# Patient Record
Sex: MALE | Race: WHITE
[De-identification: names, ages, dates, MRNs, and addresses within clinical notes are randomized per-mention and may not be internally consistent; named-entity substitution may affect disease eponyms.]

---

## 2020-11-04 ENCOUNTER — HOSPITAL ENCOUNTER (EMERGENCY)
Dept: HOSPITAL 43 - DL.ED | Age: 60
Discharge: HOME | End: 2020-11-04
Payer: COMMERCIAL

## 2020-11-04 DIAGNOSIS — Z79.01: ICD-10-CM

## 2020-11-04 DIAGNOSIS — Z79.82: ICD-10-CM

## 2020-11-04 DIAGNOSIS — Z79.899: ICD-10-CM

## 2020-11-04 DIAGNOSIS — Z20.828: ICD-10-CM

## 2020-11-04 DIAGNOSIS — J44.1: Primary | ICD-10-CM

## 2020-11-04 LAB
ANION GAP SERPL CALC-SCNC: 10 MEQ/L (ref 7–13)
CHLORIDE SERPL-SCNC: 101 MMOL/L (ref 98–107)
SODIUM SERPL-SCNC: 139 MMOL/L (ref 136–145)

## 2020-11-04 PROCEDURE — 85379 FIBRIN DEGRADATION QUANT: CPT

## 2020-11-04 PROCEDURE — 96375 TX/PRO/DX INJ NEW DRUG ADDON: CPT

## 2020-11-04 PROCEDURE — U0002 COVID-19 LAB TEST NON-CDC: HCPCS

## 2020-11-04 PROCEDURE — 36415 COLL VENOUS BLD VENIPUNCTURE: CPT

## 2020-11-04 PROCEDURE — 80053 COMPREHEN METABOLIC PANEL: CPT

## 2020-11-04 PROCEDURE — 83880 ASSAY OF NATRIURETIC PEPTIDE: CPT

## 2020-11-04 PROCEDURE — 85025 COMPLETE CBC W/AUTO DIFF WBC: CPT

## 2020-11-04 PROCEDURE — 71046 X-RAY EXAM CHEST 2 VIEWS: CPT

## 2020-11-04 PROCEDURE — 87635 SARS-COV-2 COVID-19 AMP PRB: CPT

## 2020-11-04 PROCEDURE — 96365 THER/PROPH/DIAG IV INF INIT: CPT

## 2020-11-04 PROCEDURE — 99285 EMERGENCY DEPT VISIT HI MDM: CPT

## 2020-11-04 PROCEDURE — 87804 INFLUENZA ASSAY W/OPTIC: CPT

## 2020-11-04 PROCEDURE — 87040 BLOOD CULTURE FOR BACTERIA: CPT

## 2020-11-04 PROCEDURE — 83605 ASSAY OF LACTIC ACID: CPT

## 2020-11-04 NOTE — CR
PROCEDURE INFORMATION: 

Exam: XR Chest, 2 Views 

Exam date and time: 11/4/2020 9:52 PM 

Age: 60 years old 

Clinical indication: Shortness of breath; Additional info: Short of breath 



TECHNIQUE: 

Imaging protocol: XR of the chest 

Views: 2 views. 



COMPARISON: 

No relevant prior studies available. 



FINDINGS: 

Lungs: Mild hyperinflation. 

Pleural space: Unremarkable. No pleural effusion. No pneumothorax. 

Heart/Mediastinum: Unremarkable. No cardiomegaly. 

Bones/joints: Anterior thoracic spurs.  Sternotomy wires



IMPRESSION: 

Hyperinflation. Clear lungs.

## 2020-11-04 NOTE — EDM.PDOC
ED HPI GENERAL MEDICAL PROBLEM





- General


Chief Complaint: Respiratory Problem


Stated Complaint: AMBULANCE


Time Seen by Provider: 11/04/20 21:54


Source of Information: Reports: Nursing Home Records


History Limitations: Reports: No Limitations





- History of Present Illness


INITIAL COMMENTS - FREE TEXT/NARRATIVE: 





This 61 yo male patient was brought to the ED by [facility] due to 

increased shortness of breath. The patient reports he was in the same building 

as a person with COVID last Friday (the other person got their results on 

Monday). The patient reports he started to have a sore throat over the weekend 

with increasing shortness of breath. The patient has a history of COPD, A Fib 

and a previous bypass. The patient reports he did use his inhaler about 2 hours 

prior to calling the ambulance with no symptom improvement. EMS reports the 

patient's oxygen saturation was 80% at initial presentation. The patient reports

he continues to smoke about 1/2 pack of cigarettes per day.


Onset: Gradual


Duration: Day(s):, Constant, Getting Worse


Location: Reports: Chest


Quality: Reports: Other


Severity: Moderate


Improves with: Reports: None


Worsens with: Reports: None


Context: Reports: Other


Associated Symptoms: Reports: Cough, Shortness of Breath


Treatments PTA: Reports: Breathing Treatments





- Related Data


                                    Allergies











Allergy/AdvReac Type Severity Reaction Status Date / Time


 


No Known Allergies Allergy   Verified 11/04/20 21:23











Home Meds: 


                                    Home Meds





Albuterol Sulfate [Albuterol Sulfate Hfa] 2 puff PRN 11/04/20 [History]


Aspirin 81 mg PO 11/04/20 [History]


Fluticasone Propion/Salmeterol [Advair 250-50 Diskus]  11/04/20 [History]


Rivaroxaban [Xarelto] 20 mg PO 11/04/20 [History]


Sacubitril/Valsartan [Entresto 49 mg-51 mg Tablet]  11/04/20 [History]


atorvaSTATin Calcium [Lipitor] 20 mg PO 11/04/20 [History]


carvediloL [Carvedilol] 25 mg PO BID 11/04/20 [History]











ED ROS GENERAL





- Review of Systems


Review Of Systems: Comprehensive ROS is negative, except as noted in HPI.





ED EXAM, GENERAL





- Physical Exam


Exam: See Below


Exam Limited By: No Limitations


General Appearance: Alert, WD/WN, Moderate Distress


Eye Exam: Right Eye: EOMI, Normal Inspection, PERRL, Left Eye: Other (Prostetic)


Ears: Normal External Exam, Normal Canal, Hearing Grossly Normal, Normal TMs


Nose: Normal Inspection, Normal Mucosa, No Blood


Throat/Mouth: Normal Inspection, Normal Lips, Normal Teeth, Normal Gums, Normal 

Oropharynx, Normal Voice, No Airway Compromise


Head: Atraumatic, Normocephalic


Neck: Normal Inspection, Supple, Non-Tender, Full Range of Motion


Respiratory/Chest: Decreased Breath Sounds, Rhonchi, Wheezing, Prolonged 

Expiration


Cardiovascular: Normal Peripheral Pulses, Regular Rate, Rhythm, No Edema, No 

Gallop, No JVD, No Murmur, No Rub


GI/Abdominal: Normal Bowel Sounds, Soft, Non-Tender, No Organomegaly, No 

Distention, No Abnormal Bruit, No Mass


 (Male) Exam: Deferred


Rectal (Males) Exam: Deferred


Back Exam: Normal Inspection, Full Range of Motion, NT


Extremities: Normal Inspection, Normal Range of Motion, Non-Tender, Normal 

Capillary Refill, No Pedal Edema


Neurological: Alert, Oriented, CN II-XII Intact, Normal Cognition, Normal Gait, 

Normal Reflexes, No Motor/Sensory Deficits


Psychiatric: Normal Affect, Normal Mood


Skin Exam: Warm, Dry, Intact, Normal Color, No Rash


Lymphatic: No Adenopathy





Course





- Vital Signs


Last Recorded V/S: 


                                Last Vital Signs











Temp  36.2 C   11/04/20 21:29


 


Pulse  125 H  11/04/20 21:29


 


Resp  24 H  11/04/20 21:29


 


BP  152/101 H  11/04/20 21:29


 


Pulse Ox  94 L  11/04/20 21:29














- Orders/Labs/Meds


Orders: 


                               Active Orders 24 hr











 Category Date Time Status


 


 RT Aerosol Therapy [RC] ASDIRECTED Care  11/04/20 22:00 Ordered


 


 CULTURE BLOOD [BC] Stat Lab  11/04/20 21:16 Results


 


 Isolation [COMM] Routine Oth  11/04/20 21:06 Active











Labs: 


                                Laboratory Tests











  11/04/20 11/04/20 11/04/20 Range/Units





  21:16 21:16 21:16 


 


WBC  14.2 H    (5.0-10.0)  10^3/uL


 


RBC  4.93    (4.6-6.2)  10^6/uL


 


Hgb  15.7    (14.0-18.0)  g/dL


 


Hct  45.4    (40.0-54.0)  %


 


MCV  92.1    ()  fL


 


MCH  31.8    (27.0-34.0)  pg


 


MCHC  34.6    (33.0-35.0)  g/dL


 


Plt Count  168    (150-450)  10^3/uL


 


Neut % (Auto)  75.4 H    (42.2-75.2)  %


 


Lymph % (Auto)  8.9 L    (20.5-50.1)  %


 


Mono % (Auto)  10.0 H    (2-8)  %


 


Eos % (Auto)  5.3 H    (1.0-3.0)  %


 


Baso % (Auto)  0.4    (0.0-1.0)  %


 


D-Dimer, Quantitative   < 100   (0-400)  ng/mL


 


Sodium    139  (136-145)  mmol/L


 


Potassium    4.0  (3.5-5.1)  mmol/L


 


Chloride    101  ()  mmol/L


 


Carbon Dioxide    32  (21-32)  mmol/L


 


Anion Gap    10.0  (7-13)  mEq/L


 


BUN    7  (7-18)  mg/dL


 


Creatinine    0.87  (0.70-1.30)  mg/dL


 


Est Cr Clr Drug Dosing    105.22  mL/min


 


Estimated GFR (MDRD)    > 60  


 


BUN/Creatinine Ratio    8.0  (No establ ref range)  


 


Glucose    116 H  (74-99)  mg/dL


 


Lactic Acid     (0.4-2.0)  mmol/L


 


Calcium    8.4 L  (8.5-10.1)  mg/dL


 


Total Bilirubin    0.7  (0.2-1.0)  mg/dL


 


AST    19  (15-37)  U/L


 


ALT    23  (16-63)  U/L


 


Alkaline Phosphatase    88  ()  U/L


 


B-Natriuretic Peptide    257 H  (0-100)  pg/ml


 


Total Protein    6.6  (6.4-8.2)  g/dL


 


Albumin    3.6  (3.4-5.0)  g/dL


 


Globulin    3.0  


 


Albumin/Globulin Ratio    1.2  


 


SARS CoV-2 RNA Rapid JENSEN     (NEGATIVE)  














  11/04/20 11/04/20 Range/Units





  21:16 21:17 


 


WBC    (5.0-10.0)  10^3/uL


 


RBC    (4.6-6.2)  10^6/uL


 


Hgb    (14.0-18.0)  g/dL


 


Hct    (40.0-54.0)  %


 


MCV    ()  fL


 


MCH    (27.0-34.0)  pg


 


MCHC    (33.0-35.0)  g/dL


 


Plt Count    (150-450)  10^3/uL


 


Neut % (Auto)    (42.2-75.2)  %


 


Lymph % (Auto)    (20.5-50.1)  %


 


Mono % (Auto)    (2-8)  %


 


Eos % (Auto)    (1.0-3.0)  %


 


Baso % (Auto)    (0.0-1.0)  %


 


D-Dimer, Quantitative    (0-400)  ng/mL


 


Sodium    (136-145)  mmol/L


 


Potassium    (3.5-5.1)  mmol/L


 


Chloride    ()  mmol/L


 


Carbon Dioxide    (21-32)  mmol/L


 


Anion Gap    (7-13)  mEq/L


 


BUN    (7-18)  mg/dL


 


Creatinine    (0.70-1.30)  mg/dL


 


Est Cr Clr Drug Dosing    mL/min


 


Estimated GFR (MDRD)    


 


BUN/Creatinine Ratio    (No establ ref range)  


 


Glucose    (74-99)  mg/dL


 


Lactic Acid  1.3   (0.4-2.0)  mmol/L


 


Calcium    (8.5-10.1)  mg/dL


 


Total Bilirubin    (0.2-1.0)  mg/dL


 


AST    (15-37)  U/L


 


ALT    (16-63)  U/L


 


Alkaline Phosphatase    ()  U/L


 


B-Natriuretic Peptide    (0-100)  pg/ml


 


Total Protein    (6.4-8.2)  g/dL


 


Albumin    (3.4-5.0)  g/dL


 


Globulin    


 


Albumin/Globulin Ratio    


 


SARS CoV-2 RNA Rapid JENSEN   Negative  (NEGATIVE)  











Meds: 


Medications














Discontinued Medications














Generic Name Dose Route Start Last Admin





  Trade Name Freq  PRN Reason Stop Dose Admin


 


Albuterol/Ipratropium  3 ml  11/04/20 21:59  11/04/20 22:18





  Duoneb 3.0-0.5 Mg/3 Ml  NEB  11/04/20 22:00  3 ml





  ONETIME ONE   Administration


 


Ceftriaxone Sodium 1 gm/  50 mls @ 100 mls/hr  11/04/20 21:59  11/04/20 22:18





  Sodium Chloride  IV  11/04/20 22:28  100 mls/hr





  ONETIME ONE   Administration


 


Methylprednisolone Sodium Succinate  125 mg  11/04/20 21:59  11/04/20 22:18





  Solu-Medrol  IVPUSH  11/04/20 22:00  125 mg





  ONETIME ONE   Administration














- Re-Assessments/Exams


Free Text/Narrative Re-Assessment/Exam: 





11/04/20 22:49


The patient reports symptom improvement. 





Departure





- Departure


Time of Disposition: 22:49


Disposition: Home, Self-Care 01


Condition: Fair


Clinical Impression: 


 COPD with exacerbation








- Discharge Information


*PRESCRIPTION DRUG MONITORING PROGRAM REVIEWED*: Not Applicable


*COPY OF PRESCRIPTION DRUG MONITORING REPORT IN PATIENT ALEX: Not Applicable


Instructions:  Chronic Obstructive Pulmonary Disease Exacerbation, Easy-to-Read


Forms:  ED Department Discharge


Care Plan Goals: 


The patient was advised of the examination, lab and x-ray results during the 

visit. The patient was given IV Solumedrol, IV Rocephin and a nebulized 

albuterol treatment while in the ED. The patient was discharged with a script 

for Azithromycin (250 mg) #6 to take 2 by mouth on day 1 and 1 by mouth on days 

2-5 as well as for Prednisone (20 mg) #10 to take 2 by mouth daily for 5 days. 

If the patient has any additional symptoms or concerns, the patient should 

either return to the emergency department or visit his primary care facility. 





Sepsis Event Note (ED)





- Evaluation


Sepsis Screening Result: No Definite Risk





- Focused Exam


Vital Signs: 


                                   Vital Signs











  Temp Pulse Resp BP Pulse Ox


 


 11/04/20 21:29  36.2 C  125 H  24 H  152/101 H  94 L














- My Orders


Last 24 Hours: 


My Active Orders





11/04/20 21:06


Isolation [COMM] Routine 





11/04/20 21:16


CULTURE BLOOD [BC] Stat 





11/04/20 22:00


RT Aerosol Therapy [RC] ASDIRECTED 














- Assessment/Plan


Last 24 Hours: 


My Active Orders





11/04/20 21:06


Isolation [COMM] Routine 





11/04/20 21:16


CULTURE BLOOD [BC] Stat 





11/04/20 22:00


RT Aerosol Therapy [RC] ASDIRECTED

## 2023-03-03 ENCOUNTER — HOSPITAL ENCOUNTER (EMERGENCY)
Dept: HOSPITAL 43 - DL.ED | Age: 63
LOS: 1 days | Discharge: HOME | End: 2023-03-04
Payer: COMMERCIAL

## 2023-03-03 DIAGNOSIS — Z20.822: ICD-10-CM

## 2023-03-03 DIAGNOSIS — Z79.82: ICD-10-CM

## 2023-03-03 DIAGNOSIS — Z79.899: ICD-10-CM

## 2023-03-03 DIAGNOSIS — Z79.01: ICD-10-CM

## 2023-03-03 DIAGNOSIS — Z95.1: ICD-10-CM

## 2023-03-03 DIAGNOSIS — J44.9: ICD-10-CM

## 2023-03-03 DIAGNOSIS — J18.9: Primary | ICD-10-CM

## 2023-03-03 DIAGNOSIS — I48.91: ICD-10-CM

## 2023-03-04 LAB
ANION GAP SERPL CALC-SCNC: 10.3 MEQ/L (ref 7–13)
CHLORIDE SERPL-SCNC: 102 MMOL/L (ref 98–107)
EGFRCR SERPLBLD CKD-EPI 2021: 78 ML/MIN (ref 60–?)
SARS-COV-2 RNA RESP QL NAA+PROBE: NEGATIVE
SODIUM SERPL-SCNC: 141 MMOL/L (ref 136–145)